# Patient Record
Sex: MALE | Race: BLACK OR AFRICAN AMERICAN | Employment: FULL TIME | URBAN - METROPOLITAN AREA
[De-identification: names, ages, dates, MRNs, and addresses within clinical notes are randomized per-mention and may not be internally consistent; named-entity substitution may affect disease eponyms.]

---

## 2022-10-17 ENCOUNTER — HOSPITAL ENCOUNTER (EMERGENCY)
Age: 22
Discharge: HOME | End: 2022-10-17
Payer: MEDICAID

## 2022-10-17 VITALS
WEIGHT: 175 LBS | HEART RATE: 100 BPM | OXYGEN SATURATION: 100 % | SYSTOLIC BLOOD PRESSURE: 123 MMHG | TEMPERATURE: 97.8 F | DIASTOLIC BLOOD PRESSURE: 70 MMHG | RESPIRATION RATE: 19 BRPM

## 2022-10-17 DIAGNOSIS — J06.9 URI WITH COUGH AND CONGESTION: Primary | ICD-10-CM

## 2022-10-17 PROCEDURE — 99283 EMERGENCY DEPT VISIT LOW MDM: CPT

## 2022-10-17 ASSESSMENT — ENCOUNTER SYMPTOMS
TROUBLE SWALLOWING: 0
SHORTNESS OF BREATH: 0
COLOR CHANGE: 0
EYE DISCHARGE: 0
SORE THROAT: 0
VOICE CHANGE: 0
VOMITING: 0
RHINORRHEA: 1
ABDOMINAL PAIN: 0
SINUS PAIN: 0
CHEST TIGHTNESS: 0
DIARRHEA: 0
EYE REDNESS: 0
NAUSEA: 0
BACK PAIN: 0
COUGH: 1
SINUS PRESSURE: 0
CONSTIPATION: 0

## 2022-10-17 ASSESSMENT — PAIN - FUNCTIONAL ASSESSMENT: PAIN_FUNCTIONAL_ASSESSMENT: NONE - DENIES PAIN

## 2022-10-17 NOTE — ED PROVIDER NOTES
[address] [facility]        Pt Name: Nicole Cobian  MRN: [id number]  [place] 2000  Date of evaluation: 10/17/2022  Provider: Jene Runner, PA  PCP: No primary care provider on file. Note Started: 10:33 AM EDT       ALEKSANDRA. I have evaluated this patient. My supervising physician was available for consultation. CHIEF COMPLAINT       Chief Complaint   Patient presents with    Other     Pt requesting note to return to work        HISTORY OF PRESENT ILLNESS   (Location, Timing/Onset, Context/Setting, Quality, Duration, Modifying Factors, Severity, Associated Signs and Symptoms)  Note limiting factors. Chief Complaint: Cuco Cobian is a 23 y.o. male with no significant past medical history who presents to the ED with complaint of upper respiratory symptoms. Patient states on Friday he started feeling sick. Patient states he has had upper respiratory symptoms including cough, rhinorrhea and congestion. Patient states he needs a note to return to work. Patient states he cannot go back to work until he has a note to return. Patient states he is still having symptoms states he feels better than he did on Friday but states he still having a little bit of a nonproductive cough and associated congestion/rhinorrhea. He denies any fever or chills. Denies sick contacts or recent travel. Denies rashes or lesions. Denies sinus pressure/pain, ear pain/drainage, sore throat, chest pain, shortness of breath, abdominal pain, nausea/vomiting, urinary symptoms or changes in bowel movements. Patient denies any known exposure to COVID-19. Patient is unsure of his work needs him tested for COVID-19 prior to returning. Nursing Notes were all reviewed and agreed with or any disagreements were addressed in the HPI.     REVIEW OF SYSTEMS    (2-9 systems for level 4, 10 or more for level 5)     Review of Systems   Constitutional:  Negative for Normal appearance. He is well-developed. He is not ill-appearing, toxic-appearing or diaphoretic. HENT:      Head: Normocephalic and atraumatic. Right Ear: Tympanic membrane, ear canal and external ear normal. There is no impacted cerumen. Left Ear: Tympanic membrane, ear canal and external ear normal. There is no impacted cerumen. Nose: Nose normal. No congestion or rhinorrhea. Mouth/Throat:      Mouth: Mucous membranes are moist.      Pharynx: No oropharyngeal exudate or posterior oropharyngeal erythema. Eyes:      General:         Right eye: No discharge. Left eye: No discharge. Conjunctiva/sclera: Conjunctivae normal.   Cardiovascular:      Rate and Rhythm: Normal rate and regular rhythm. Pulses: Normal pulses. Heart sounds: Normal heart sounds. No murmur heard. No friction rub. No gallop. Pulmonary:      Effort: Pulmonary effort is normal. No respiratory distress. Breath sounds: Normal breath sounds. No stridor. No wheezing, rhonchi or rales. Chest:      Chest wall: No tenderness. Abdominal:      General: Abdomen is flat. There is no distension. Palpations: Abdomen is soft. There is no mass. Tenderness: There is no abdominal tenderness. There is no right CVA tenderness, left CVA tenderness, guarding or rebound. Hernia: No hernia is present. Musculoskeletal:         General: Normal range of motion. Cervical back: Normal range of motion and neck supple. No rigidity or tenderness. Lymphadenopathy:      Cervical: No cervical adenopathy. Skin:     General: Skin is warm and dry. Coloration: Skin is not pale. Findings: No erythema or rash. Neurological:      Mental Status: He is alert and oriented to person, place, and time.    Psychiatric:         Behavior: Behavior normal.       DIAGNOSTIC RESULTS   LABS:    Labs Reviewed   DUM-70   COVID-19   COVID-19   COVID-19       When ordered only abnormal lab results are displayed. All other labs were within normal range or not returned as of this dictation. EKG: When ordered, EKG's are interpreted by the Emergency Department Physician in the absence of a cardiologist.  Please see their note for interpretation of EKG. RADIOLOGY:   Non-plain film images such as CT, Ultrasound and MRI are read by the radiologist. Plain radiographic images are visualized and preliminarily interpreted by the ED Provider with the below findings:        Interpretation per the Radiologist below, if available at the time of this note:    No orders to display     No results found. PROCEDURES   Unless otherwise noted below, none     Procedures    CRITICAL CARE TIME       CONSULTS:  None      EMERGENCY DEPARTMENT COURSE and DIFFERENTIAL DIAGNOSIS/MDM:   Vitals:    Vitals:    10/17/22 1010   BP: 123/70   Pulse: 100   Resp: 19   Temp: 97.8 °F (36.6 °C)   SpO2: 100%   Weight: 175 lb (79.4 kg)       Patient was given the following medications:  Medications - No data to display      Is this patient to be included in the SEP-1 Core Measure due to severe sepsis or septic shock? No   Exclusion criteria - the patient is NOT to be included for SEP-1 Core Measure due to:  Viral etiology found or highly suspected (including COVID-19) without concomitant bacterial infection    Patient is a 26-year-old male who presents to the ED with complaint of upper respiratory symptoms. He has been sick since Friday. He started to feel better but still having symptoms. His work needs him to receive a work note so he can return to work. Concern potentially could be for upper respiratory illness including viral etiology or potential COVID-19. Given the fact the patient still reports he is having symptoms with cough and rhinorrhea/congestion he is only day 3 of 4 of symptoms to feel comfortable writing patient note to return to work until he test negative for COVID.   If he is COVID-negative sounds like it is other just (4) no impairment

## 2022-10-17 NOTE — LETTER
[facility] Emergency Department  [address], [place], [address]             October 17, 2022    Patient: Cary Mora   YOB: 2000   Date of Visit: 10/17/2022       To Whom It May Concern:    Cary Mora was seen and treated in our emergency department on 10/17/2022.  He may return to work if [facility] test results as negative      Sincerely,         [facility]